# Patient Record
Sex: MALE | URBAN - METROPOLITAN AREA
[De-identification: names, ages, dates, MRNs, and addresses within clinical notes are randomized per-mention and may not be internally consistent; named-entity substitution may affect disease eponyms.]

---

## 2024-04-12 ENCOUNTER — TELEPHONE (OUTPATIENT)
Dept: FAMILY MEDICINE | Facility: CLINIC | Age: 34
End: 2024-04-12

## 2024-04-12 NOTE — TELEPHONE ENCOUNTER
----- Message from Aicha Lozano sent at 4/12/2024  4:14 PM CDT -----  Regarding: patient call back  Type: Patient Call Back    Who called: Self     What is the request in detail: patient is interested in seeking a new PCP. Just moved in town and would like to get a nurse to call to get a brief introduction for Dr. Stephen, Dr. Gongora, and Dr. LALO Richards before he schedules an apt with one of them. He is available from 11 am-4 pm Monday- Friday     Can the clinic reply by [facility]? No     Would the patient rather a call back or a response via [facility]?     Best call back number: .[phone]

## 2024-05-06 ENCOUNTER — TELEPHONE (OUTPATIENT)
Dept: FAMILY MEDICINE | Facility: CLINIC | Age: 34
End: 2024-05-06

## 2024-05-06 NOTE — TELEPHONE ENCOUNTER
Phone pt get him the information that  he can go to the Doctor profile and see what he is looking for the  pt states that he don't need to do that I will not choosing  I reply thanks you have a Great Day

## 2024-05-06 NOTE — TELEPHONE ENCOUNTER
----- Message from Miguelina Wood sent at 5/6/2024  8:21 AM CDT -----  Regarding: Self .[phone]  Type: Patient Call Back     What is the request in detail: Please call pt back with info on the providers, He would like to receive care, and  he wants a call back from staff in regards to helping get information on each providers     Can the clinic reply by [facility]? No     Would the patient rather a call back or a response via [facility]? Call back    Best call back number: .[phone]      Additional Information:    Thank you.